# Patient Record
Sex: FEMALE | Race: WHITE | NOT HISPANIC OR LATINO | Employment: STUDENT | URBAN - METROPOLITAN AREA
[De-identification: names, ages, dates, MRNs, and addresses within clinical notes are randomized per-mention and may not be internally consistent; named-entity substitution may affect disease eponyms.]

---
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
## 2019-03-18 LAB
INFLUENZA A ANTIGEN, POC: NEGATIVE
INFLUENZA B ANTIGEN, POC: NEGATIVE
RAPID GROUP A STREP (OHS): NEGATIVE

## 2021-12-06 ENCOUNTER — HISTORICAL (OUTPATIENT)
Dept: ADMINISTRATIVE | Facility: HOSPITAL | Age: 21
End: 2021-12-06

## 2022-02-24 ENCOUNTER — HISTORICAL (OUTPATIENT)
Dept: SURGERY | Facility: HOSPITAL | Age: 22
End: 2022-02-24

## 2022-04-07 ENCOUNTER — HISTORICAL (OUTPATIENT)
Dept: ADMINISTRATIVE | Facility: HOSPITAL | Age: 22
End: 2022-04-07
Payer: COMMERCIAL

## 2022-04-23 VITALS
SYSTOLIC BLOOD PRESSURE: 136 MMHG | DIASTOLIC BLOOD PRESSURE: 93 MMHG | BODY MASS INDEX: 25.7 KG/M2 | WEIGHT: 145.06 LBS | HEIGHT: 63 IN | OXYGEN SATURATION: 98 %

## 2022-05-02 ENCOUNTER — OFFICE VISIT (OUTPATIENT)
Dept: ORTHOPEDICS | Facility: CLINIC | Age: 22
End: 2022-05-02
Payer: COMMERCIAL

## 2022-05-02 VITALS
HEIGHT: 62 IN | DIASTOLIC BLOOD PRESSURE: 87 MMHG | HEART RATE: 76 BPM | WEIGHT: 145 LBS | BODY MASS INDEX: 26.68 KG/M2 | SYSTOLIC BLOOD PRESSURE: 124 MMHG

## 2022-05-02 DIAGNOSIS — Z98.890 S/P ARTHROSCOPY OF KNEE: Primary | ICD-10-CM

## 2022-05-02 PROCEDURE — 3008F PR BODY MASS INDEX (BMI) DOCUMENTED: ICD-10-PCS | Mod: CPTII,,, | Performed by: ORTHOPAEDIC SURGERY

## 2022-05-02 PROCEDURE — 3008F BODY MASS INDEX DOCD: CPT | Mod: CPTII,,, | Performed by: ORTHOPAEDIC SURGERY

## 2022-05-02 PROCEDURE — 3079F DIAST BP 80-89 MM HG: CPT | Mod: CPTII,,, | Performed by: ORTHOPAEDIC SURGERY

## 2022-05-02 PROCEDURE — 3079F PR MOST RECENT DIASTOLIC BLOOD PRESSURE 80-89 MM HG: ICD-10-PCS | Mod: CPTII,,, | Performed by: ORTHOPAEDIC SURGERY

## 2022-05-02 PROCEDURE — 99024 PR POST-OP FOLLOW-UP VISIT: ICD-10-PCS | Mod: ,,, | Performed by: ORTHOPAEDIC SURGERY

## 2022-05-02 PROCEDURE — 3074F PR MOST RECENT SYSTOLIC BLOOD PRESSURE < 130 MM HG: ICD-10-PCS | Mod: CPTII,,, | Performed by: ORTHOPAEDIC SURGERY

## 2022-05-02 PROCEDURE — 1160F PR REVIEW ALL MEDS BY PRESCRIBER/CLIN PHARMACIST DOCUMENTED: ICD-10-PCS | Mod: CPTII,,, | Performed by: ORTHOPAEDIC SURGERY

## 2022-05-02 PROCEDURE — 1160F RVW MEDS BY RX/DR IN RCRD: CPT | Mod: CPTII,,, | Performed by: ORTHOPAEDIC SURGERY

## 2022-05-02 PROCEDURE — 1159F MED LIST DOCD IN RCRD: CPT | Mod: CPTII,,, | Performed by: ORTHOPAEDIC SURGERY

## 2022-05-02 PROCEDURE — 1159F PR MEDICATION LIST DOCUMENTED IN MEDICAL RECORD: ICD-10-PCS | Mod: CPTII,,, | Performed by: ORTHOPAEDIC SURGERY

## 2022-05-02 PROCEDURE — 99024 POSTOP FOLLOW-UP VISIT: CPT | Mod: ,,, | Performed by: ORTHOPAEDIC SURGERY

## 2022-05-02 PROCEDURE — 3074F SYST BP LT 130 MM HG: CPT | Mod: CPTII,,, | Performed by: ORTHOPAEDIC SURGERY

## 2022-05-02 NOTE — HISTORICAL [facility] CERNER
This is a historical note converted from Lucian. Formatting and pictures may have been removed.  Please reference Lucian for original formatting and attached multimedia. Chief Complaint  New patient here with left knee increasing pain over the last week. Amb with brace. Urgent care- meds given. No specefic injury. Did have trouble in High school. Xrays today. Cheer  @ [facility]. Unable to obtain BP.  History of Present Illness  She is a pleasant 22yo who presents with an exacerbation of knee pain over the past week. She originally injured her knee in high school but never had formal imaging. Her knee has bothered her intermittently since then. She denies recent reinjury. 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 When she got up from that spot she felt knee pain. Pain worsened throughout the week and shes had difficulty ambulating. She does report some swelling in her knee. She was seen at an Urgent care where they prescribed oral Prednisone and Norco. Pain is worse with ambulating. It is ok with bending but hurts with extension. She has been wearing a knee brace without much relief.  Review of Systems  Comprehensive review of system was performed with no exceptions other than noted in the history of present illness  Physical Exam  Vitals & Measurements  HT:?161.00?cm? WT:?65.800?kg? BMI:?25.38? LMP:?11/25/2021 00:00 CST?  Gen: WN, WD, NAD  Card/Res: NL breathing, +distal pulses  Abdomen: ND  Standing exam  stance: normal alignment, no significant leg-length discrepancy  gait:?limp  ?   Knee examination  - General comments: unremarkable appearance  ?   - Tenderness: lateral, suprapatellar  ?   Knee???????????????RIGHT??????????LEFT  Skin:??????????????? Intact ??????????Intact  ROM:??????????????? 0-130??????????0-130  Effusion:??????????? Neg???????????? +  MJL TTP:?????????? Neg?????????? ?? Neg  LJL TTP:????????????Neg?????????????  +  Ang:???????? ?Neg???????????? Neg  Pat crep:??????????? Neg???????????? Neg  Patella TTPs:????? Neg?????????????Neg  Patella grind:????? Neg??????????? ?Neg  Lachman:???????????Neg???????????? Neg  Pivot shift: ?????? ??Neg??????????? Neg  Valgus stress:???? ?Neg??????? ???Neg  Varus stress:?????? Neg????????????+  Posterior drawer: Neg????????? ??Neg  ?   N-V????????????????intact????????????? intact  Hip:?????????????????nml?????????????? nml  ?   Lower extremity edema:Negative??  Assessment/Plan  1.?Internal derangement of knee joint?M23.90  ?Xrays today show normal bony alignment. Concern for meniscus or ligament injury. We will order an MRI and see her back after for results.  Orders:  ketorolac, 10 mg = 1 tab(s), Oral, TID, X 5 day(s), # 15 tab(s), 0 Refill(s), Pharmacy: [id number], 161, cm, Height/Length Dosing, 12/06/21 16:10:00 CST, 65.8, kg, Weight Dosing, 12/06/21 16:10:00 CST  MRI Ext Lower Joint Left W/O Contrast, Routine, 12/06/21 16:16:00 CST, Other (please specify), knee, None, Stretcher, Patient Has IV?, Rad Type, Order for future visit, Left knee pain, Schedule this test, [facility], 12/06/21 16:16:00 CST  Referrals  Clinic Follow up, *Est. 12/13/21 3:00:00 CST, Order for future visit, Internal derangement of knee joint, [facility]   Problem List/Past Medical History  Ongoing  Deviated nasal septum  Exercise tolerance  Nasal fracture  Sinusitis  Historical  Able to lie down  Procedure/Surgical History  95333 - BILAT RESECT INFERIOR TURBINATE (Bilateral) (03/07/2017)  68251 - REP NASAL SEPTUM / REPL W/ GRFT (None) (03/07/2017)  31309 - BILAT SURG FESS TOTAL ETHMOID (Bilateral) (03/07/2017)  59980 - BILAT SURG FESS MAXIL W/ TIS REM MAXIL SINUS (Bilateral) (03/07/2017)  16737 - SURG FESS W/ FRONTAL SINUS EXPLORATION (Bilateral) (03/07/2017)  99885 - BILAT SURG FESS W/ SPHEN W/ TIS REM SPHEN SINUS (Bilateral) (03/07/2017)  Excision of Left Ethmoid Sinus, Percutaneous  Endoscopic Approach (03/07/2017)  Excision of Left Frontal Sinus, Percutaneous Endoscopic Approach (03/07/2017)  Excision of Left Maxillary Sinus, Percutaneous Endoscopic Approach (03/07/2017)  Excision of Left Sphenoid Sinus, Percutaneous Endoscopic Approach (03/07/2017)  Excision of Nasal Septum, Open Approach (03/07/2017)  Excision of Nasal Turbinate, Percutaneous Endoscopic Approach (03/07/2017)  Excision of Right Ethmoid Sinus, Percutaneous Endoscopic Approach (03/07/2017)  Excision of Right Frontal Sinus, Percutaneous Endoscopic Approach (03/07/2017)  Excision of Right Maxillary Sinus, Percutaneous Endoscopic Approach (03/07/2017)  Excision of Right Sphenoid Sinus, Percutaneous Endoscopic Approach (03/07/2017)  Nasal/sinus endoscopy, surgical with frontal sinus exploration, with or without removal of tissue from frontal sinus (03/07/2017)  Nasal/sinus endoscopy, surgical, with maxillary antrostomy; with removal of tissue from maxillary sinus (03/07/2017)  Nasal/sinus endoscopy, surgical, with sphenoidotomy; with removal of tissue from the sphenoid sinus (03/07/2017)  Nasal/sinus endoscopy, surgical; with ethmoidectomy, total (anterior and posterior) (03/07/2017)  Septoplasty or submucous resection, with or without cartilage scoring, contouring or replacement with graft (03/07/2017)  Submucous resection inferior turbinate, partial or complete, any method (03/07/2017)  adenoidectomy  PE tubes   Medications  acetaminophen-hydrocodone 325 mg-5 mg oral tablet, 1 tab(s), Oral, q6hr  prednisONE 10 mg oral tablet, 30 mg= 3 tab(s), Oral, Daily,? ?Not taking  Toradol 10 mg oral tablet, 10 mg= 1 tab(s), Oral, TID  Allergies  No Known Allergies  Social History  Abuse/Neglect  No, No, Yes, 12/06/2021  Alcohol  Current, Beer, 1-2 times per week, 07/16/2021  Employment/School  Student, 03/03/2017  Substance Use  Never, 07/16/2021  Tobacco  Never (less than 100 in lifetime), N/A, 12/06/2021  Family History  Family history is  negative  Health Maintenance  Health Maintenance  ???Pending?(in the next year)  ??? ??Due?  ??? ? ? ?ADL Screening due??12/06/21??and every 1??year(s)  ??? ? ? ?Depression Screening due??12/06/21??Unknown Frequency  ??? ? ? ?Tetanus Vaccine due??12/06/21??and every 10??year(s)  ??? ??Due In Future?  ??? ? ? ?Obesity Screening not due until??01/01/22??and every 1??year(s)  ??? ? ? ?Alcohol Misuse Screening not due until??01/02/22??and every 1??year(s)  ??? ? ? ?Blood Pressure Screening not due until??07/16/22??and every 1??year(s)  ???Satisfied?(in the past 1 year)  ??? ??Satisfied?  ??? ? ? ?Alcohol Misuse Screening on??12/06/21.??Satisfied by Anna Diggs L. L.  ??? ? ? ?Blood Pressure Screening on??07/16/21.??Satisfied by Venancio Beltran  ??? ? ? ?Body Mass Index Check on??12/06/21.??Satisfied by Anna Diggs L. L.  ??? ? ? ?Cervical Cancer Screening on??03/15/21.??Satisfied by Savanah Varela  ??? ? ? ?Influenza Vaccine on??12/06/21.??Satisfied by Anna Diggs L. L.  ??? ? ? ?Obesity Screening on??12/06/21.??Satisfied by Anna Diggs L. L.  ?

## 2022-05-02 NOTE — PROGRESS NOTES
Chief Complaint:   Chief Complaint   Patient presents with    Left Knee - Follow-up    Follow-up     6wk F/U Left knee arthroscopic plica excision with a lateral patellar friction syndrome release sx 2/24/22 GL 5/25/22       History of present illness:  02/24/2022:  Left knee arthroscopic plica excision with a lateral release    She returns today.  She has been back in school without difficulty.  She occasionally has a pop in the knee but not much in the way of pain.  So far she is happy with the results.  She has finished up her physical therapy    Musculoskeletal:   Her incisions are well healed.  Her range of motion is full.  Distally she is neurovascular intact       Assessment: S/P arthroscopy of knee        Plan:  Doing well status post above.  Continue home exercise program.  I will see her back if she has any issues

## 2022-05-14 NOTE — OP NOTE
Patient:   Lindsey Gallegos            MRN: [id number]            FIN: [id number]               Age:   22 years     Sex:  Female     :  2000   Associated Diagnoses:   None   Author:   Nicholas Fraire Jr, MD      SURGEON: Nicholas Fraire MD     ASSISTANT: Bita Dubois NP. Mrs. Dubois was essential in manipulating the leg while I performed the arthroscopy, retraction, and closure.    PREOPERATIVE DIAGNOSIS:   Left knee plica syndrome    POSTOPERATIVE DIAGNOSIS:   Left knee plica syndrome, lateral patellar friction syndrome    PROCEDURE PERFORMED:  1.  Left knee arthroscopic plica excision with a lateral release    ESTIMATED BLOOD LOSS: 10cc.    COMPLICATIONS: None.    TOURNIQUET TIME: About 20 minutes.    ANTIBIOTICS: Ancef     INDICATIONS FOR PROCEDURE: Lindsey is a 22y.o. female who has had ongoing left knee pain. The patient has had to limit activity due to intermittent pain & occasional mechanical symptoms. An MRI was performed that showed lateral plica that I felt would be amenable to arthroscopic debridement. The patient decided to opt for surgery after failing conservative management.    OPERATIVE REPORT: Lindsey was initially seen in the preoperative holding area where history and physical were reviewed without change. The operative leg was marked, consents were reviewed, and any questions were answered for the patient and family. The patient was then taken back to the operating room, placed supine on the operating table with all bony prominences padded. Then a nonsterile tourniquet was placed around the upper thigh. The patient was induced under general anesthesia. The operative lower extremity was prepped and draped in standard sterile fashion. A timeout led by the surgeon was performed, and preoperative antibiotics were given. The limp was exsanguinated with gravity. The tourniquet was raised to 100 mmHg over the systolic blood pressure.    The knee was then flexed and the inferolateral portal  was created with an #11 blade scalpel.    The camera was introduced, using the blunt trocar, into the suprapatellar pouch. The undersurface of the patella was found to have no chondral wear and the trochlear groove was found to have no chondral wear . The camera was then taken down into the lateral gutter, where no loose bodies and a plica were found. The camera was then brought into the medial gutter, where no loose bodies and no plica were seen. The camera was then brought into the medial compartment.  I used the shaver in order to debride the lateral plica and then also removed a portion of the lateral capsule in order to create more space laterally.    An inferomedial portal was then localized with a spinal needle, and created using an #11 blade. The medial meniscus was probed and found to have a no tears.  The medial femoral condyle was found to have no wear. The medial tibial plateau demonstrated no wear.    The camera was then turned to the notch, where the ACL was found to be intact.    Then the leg was brought into figure-of-four position. The camera was brought into the lateral compartment. The lateral meniscus was probed and found to have no tears.  The lateral femoral condyle was found to have no chondral wear. The lateral tibial plateau had no chondral wear.    The knee was once more examined for loose bodies, of which none were found. The knee was then evacuated of all fluids. The portals were closed with 3-0 monocyrl interrupted sutures and steristrips. 10mL of 0.25% Bupivicaine were infiltrated around each portal. A sterile dressing was placed, and the tourniquet was deflated after about 20 minutes. The patient was awakened from anesthesia and taken to the postoperative care unit in stable condition.

## 2022-09-15 ENCOUNTER — HISTORICAL (OUTPATIENT)
Dept: ADMINISTRATIVE | Facility: HOSPITAL | Age: 22
End: 2022-09-15
Payer: COMMERCIAL

## 2023-02-15 ENCOUNTER — HOSPITAL ENCOUNTER (OUTPATIENT)
Dept: RADIOLOGY | Facility: CLINIC | Age: 23
Discharge: HOME | End: 2023-02-15
Attending: ORTHOPAEDIC SURGERY
Payer: COMMERCIAL

## 2023-02-15 ENCOUNTER — OFFICE VISIT (OUTPATIENT)
Dept: ORTHOPEDICS | Facility: CLINIC | Age: 23
End: 2023-02-15
Payer: COMMERCIAL

## 2023-02-15 VITALS
HEIGHT: 62 IN | BODY MASS INDEX: 28.52 KG/M2 | HEART RATE: 76 BPM | DIASTOLIC BLOOD PRESSURE: 87 MMHG | WEIGHT: 155 LBS | SYSTOLIC BLOOD PRESSURE: 124 MMHG

## 2023-02-15 DIAGNOSIS — M25.512 ACUTE PAIN OF LEFT SHOULDER: ICD-10-CM

## 2023-02-15 DIAGNOSIS — M54.12 CERVICAL RADICULOPATHY: Primary | ICD-10-CM

## 2023-02-15 PROCEDURE — 3079F PR MOST RECENT DIASTOLIC BLOOD PRESSURE 80-89 MM HG: ICD-10-PCS | Mod: CPTII,,, | Performed by: ORTHOPAEDIC SURGERY

## 2023-02-15 PROCEDURE — 3079F DIAST BP 80-89 MM HG: CPT | Mod: CPTII,,, | Performed by: ORTHOPAEDIC SURGERY

## 2023-02-15 PROCEDURE — 73030 XR SHOULDER COMPLETE 2 OR MORE VIEWS LEFT: ICD-10-PCS | Mod: LT,,, | Performed by: ORTHOPAEDIC SURGERY

## 2023-02-15 PROCEDURE — 3074F PR MOST RECENT SYSTOLIC BLOOD PRESSURE < 130 MM HG: ICD-10-PCS | Mod: CPTII,,, | Performed by: ORTHOPAEDIC SURGERY

## 2023-02-15 PROCEDURE — 72050 XR CERVICAL SPINE COMPLETE 5 VIEW: ICD-10-PCS | Mod: ,,, | Performed by: ORTHOPAEDIC SURGERY

## 2023-02-15 PROCEDURE — 1159F MED LIST DOCD IN RCRD: CPT | Mod: CPTII,,, | Performed by: ORTHOPAEDIC SURGERY

## 2023-02-15 PROCEDURE — 3008F BODY MASS INDEX DOCD: CPT | Mod: CPTII,,, | Performed by: ORTHOPAEDIC SURGERY

## 2023-02-15 PROCEDURE — 3008F PR BODY MASS INDEX (BMI) DOCUMENTED: ICD-10-PCS | Mod: CPTII,,, | Performed by: ORTHOPAEDIC SURGERY

## 2023-02-15 PROCEDURE — 1159F PR MEDICATION LIST DOCUMENTED IN MEDICAL RECORD: ICD-10-PCS | Mod: CPTII,,, | Performed by: ORTHOPAEDIC SURGERY

## 2023-02-15 PROCEDURE — 3074F SYST BP LT 130 MM HG: CPT | Mod: CPTII,,, | Performed by: ORTHOPAEDIC SURGERY

## 2023-02-15 PROCEDURE — 73030 X-RAY EXAM OF SHOULDER: CPT | Mod: LT,,, | Performed by: ORTHOPAEDIC SURGERY

## 2023-02-15 PROCEDURE — 99213 PR OFFICE/OUTPT VISIT, EST, LEVL III, 20-29 MIN: ICD-10-PCS | Mod: ,,, | Performed by: ORTHOPAEDIC SURGERY

## 2023-02-15 PROCEDURE — 99213 OFFICE O/P EST LOW 20 MIN: CPT | Mod: ,,, | Performed by: ORTHOPAEDIC SURGERY

## 2023-02-15 PROCEDURE — 72050 X-RAY EXAM NECK SPINE 4/5VWS: CPT | Mod: ,,, | Performed by: ORTHOPAEDIC SURGERY

## 2023-02-15 RX ORDER — METHYLPREDNISOLONE 4 MG/1
TABLET ORAL
Qty: 21 EACH | Refills: 0 | Status: SHIPPED | OUTPATIENT
Start: 2023-02-15 | End: 2023-03-08

## 2023-02-15 NOTE — PROGRESS NOTES
Chief Complaint:   Chief Complaint   Patient presents with    Left Shoulder - Numbness, Pain    Shoulder Pain     left shoulder pain and numbness after reaching for something in classroom today, does not remember doing anything abnormal to make it hurt       Consulting Physician: No ref. provider found    History of present illness:    she is a pleasant 23 y.o. year old female who was reaching with the right arm and her left arm became numb.  She had numbness from basically the mid arm down.  She is noticed no weakness.  It has been persistently numb for today.  She denies any neck pain or shoulder pain.  She does not have pain at all.      History reviewed. No pertinent past medical history.    Past Surgical History:   Procedure Laterality Date    KNEE SURGERY      NOSE SURGERY         No current outpatient medications on file.     No current facility-administered medications for this visit.       Review of patient's allergies indicates:  No Known Allergies    Family History   Problem Relation Age of Onset    No Known Problems Mother     Hypertension Father     Cancer Paternal Grandmother        Social History     Socioeconomic History    Marital status: Single   Tobacco Use    Smoking status: Never    Smokeless tobacco: Never   Substance and Sexual Activity    Alcohol use: Yes    Drug use: Never    Sexual activity: Yes       Review of Systems:    Constitution:   Denies chills, fever, and sweats.  HENT:   Denies headaches or blurry vision.  Cardiovascular:  Denies chest pain or irregular heart beat.  Respiratory:   Denies cough or shortness of breath.  Gastrointestinal:  Denies abdominal pain, nausea, or vomiting.  Musculoskeletal:   Denies muscle cramps.  Neurological:   Denies dizziness or focal weakness.  Psychiatric/Behavior: Normal mental status.  Hematology/Lymph:  Denies bleeding problem or easy bruising/bleeding.  Skin:    Denies rash or suspicious lesions.    Examination:    Vital Signs:    Vitals:     "02/15/23 1436 02/15/23 1437   BP: 124/87    Pulse: 76    Weight: 70.3 kg (155 lb)    Height: 5' 2" (1.575 m)    PainSc:    2       Body mass index is 28.35 kg/m².    Constitution:   Well-developed, well nourished patient in no acute distress.  Neurological:   Alert and oriented x 3 and cooperative to examination.     Psychiatric/Behavior: Normal mental status.  Respiratory:   No shortness of breath.  Eyes:    Extraoccular muscles intact  Skin:    No scars, rash or suspicious lesions.    MSK:   Shoulder Exam:                   Right        Left  Skin:                                   Normal     Normal  AC joint tenderness:           None         None  Forward Flexion:                180            180  Abduction:                          180           180  External Rotation:               80              80  Internal Rotation:                80             80  Supraspinatus stress test: Neg           Neg  Hawkin's Impingement:     Neg           Neg  Neer Impingement:            Neg           Neg  Apprehension:                   Neg           Neg  Morovis's:                           Neg           Neg  Speed's test:                     Neg            Neg  Strength:  External Rotation:           5/5                5/5  Lift Off/belly press:          5/5                5/5    N-V status:                   Intact             Intact    C-spine: Normal ROM, NT      Imaging: X-rays ordered and images interpreted today personally by me of four views of the shoulder and five views of the cervical spine showed normal bony alignment.         Assessment: Cervical radiculopathy  -     X-Ray Cervical Spine Complete 5 view; Future; Expected date: 02/15/2023  -     X-Ray Shoulder 2 or More Views Left; Future; Expected date: 02/15/2023    Other orders  -     methylPREDNISolone (MEDROL DOSEPACK) 4 mg tablet; use as directed  Dispense: 21 each; Refill: 0        Plan:  We will start a Medrol Dosepak.  If her pain persists will consider " EMG or MRI.

## 2023-12-18 ENCOUNTER — OFFICE VISIT (OUTPATIENT)
Dept: URGENT CARE | Facility: CLINIC | Age: 23
End: 2023-12-18
Payer: COMMERCIAL

## 2023-12-18 VITALS
HEART RATE: 85 BPM | RESPIRATION RATE: 16 BRPM | TEMPERATURE: 98 F | DIASTOLIC BLOOD PRESSURE: 85 MMHG | HEIGHT: 62 IN | SYSTOLIC BLOOD PRESSURE: 124 MMHG | BODY MASS INDEX: 28.71 KG/M2 | OXYGEN SATURATION: 98 % | WEIGHT: 156 LBS

## 2023-12-18 DIAGNOSIS — B00.9 HERPES SIMPLEX: Primary | ICD-10-CM

## 2023-12-18 DIAGNOSIS — J06.9 ACUTE URI: ICD-10-CM

## 2023-12-18 PROCEDURE — 99203 PR OFFICE/OUTPT VISIT, NEW, LEVL III, 30-44 MIN: ICD-10-PCS | Mod: 25,,, | Performed by: PHYSICIAN ASSISTANT

## 2023-12-18 PROCEDURE — 96372 THER/PROPH/DIAG INJ SC/IM: CPT | Mod: ,,, | Performed by: PHYSICIAN ASSISTANT

## 2023-12-18 PROCEDURE — 96372 PR INJECTION,THERAP/PROPH/DIAG2ST, IM OR SUBCUT: ICD-10-PCS | Mod: ,,, | Performed by: PHYSICIAN ASSISTANT

## 2023-12-18 PROCEDURE — 99203 OFFICE O/P NEW LOW 30 MIN: CPT | Mod: 25,,, | Performed by: PHYSICIAN ASSISTANT

## 2023-12-18 RX ORDER — VALACYCLOVIR HYDROCHLORIDE 1 G/1
2000 TABLET, FILM COATED ORAL EVERY 12 HOURS
Qty: 4 TABLET | Refills: 0 | Status: SHIPPED | OUTPATIENT
Start: 2023-12-18 | End: 2023-12-19

## 2023-12-18 RX ORDER — DEXAMETHASONE SODIUM PHOSPHATE 100 MG/10ML
10 INJECTION INTRAMUSCULAR; INTRAVENOUS
Status: COMPLETED | OUTPATIENT
Start: 2023-12-18 | End: 2023-12-18

## 2023-12-18 RX ADMIN — DEXAMETHASONE SODIUM PHOSPHATE 10 MG: 100 INJECTION INTRAMUSCULAR; INTRAVENOUS at 03:12

## 2023-12-18 NOTE — PROGRESS NOTES
"Subjective:      Patient ID: Lindsey Gallegos is a 23 y.o. female.    Vitals:  height is 5' 2" (1.575 m) and weight is 70.8 kg (156 lb). Her temperature is 98.3 °F (36.8 °C). Her blood pressure is 124/85 and her pulse is 85. Her respiration is 16 and oxygen saturation is 98%.     Chief Complaint: Sinus Problem    Patient presents to the clinic today with complaints of cough, nasal congestion X 1 week. Dayquil and Claritin taken with moderate relief.  Denies any fever or shortness of breath.  She reports a 2 day history of fever blisters present on both the upper and lower lip.      ROS   Objective:     Physical Exam   Constitutional: She is oriented to person, place, and time. She appears well-developed. She is cooperative.  Non-toxic appearance. She does not appear ill. No distress.   HENT:   Head: Normocephalic and atraumatic.   Ears:   Right Ear: Hearing, tympanic membrane, external ear and ear canal normal.   Left Ear: Hearing, tympanic membrane, external ear and ear canal normal.   Nose: Nose normal. No nasal deformity. No epistaxis.   Mouth/Throat: Uvula is midline, oropharynx is clear and moist and mucous membranes are normal. No trismus in the jaw. Normal dentition. No uvula swelling. No oropharyngeal exudate, posterior oropharyngeal edema or posterior oropharyngeal erythema.   Eyes: Conjunctivae and lids are normal. No scleral icterus.   Neck: Trachea normal and phonation normal. Neck supple. No edema present. No erythema present. No neck rigidity present.   Cardiovascular: Normal rate, regular rhythm, normal heart sounds and normal pulses.   Pulmonary/Chest: Effort normal and breath sounds normal. No respiratory distress. She has no decreased breath sounds. She has no rhonchi.   Abdominal: Normal appearance.   Musculoskeletal: Normal range of motion.         General: No deformity. Normal range of motion.   Neurological: She is alert and oriented to person, place, and time. She exhibits normal muscle " "tone. Coordination normal.   Skin: Skin is warm, dry, intact, not diaphoretic and not pale.   Psychiatric: Her speech is normal and behavior is normal. Judgment and thought content normal.   Nursing note and vitals reviewed.         Previous History      Review of patient's allergies indicates:  No Known Allergies    Past Medical History:   Diagnosis Date    Known health problems: none      Current Outpatient Medications   Medication Instructions    valACYclovir (VALTREX) 2,000 mg, Oral, Every 12 hours     Past Surgical History:   Procedure Laterality Date    KNEE SURGERY      NOSE SURGERY       Family History   Problem Relation Age of Onset    No Known Problems Mother     Hypertension Father     Cancer Paternal Grandmother        Social History     Tobacco Use    Smoking status: Never     Passive exposure: Never    Smokeless tobacco: Never   Substance Use Topics    Alcohol use: Yes    Drug use: Never        Physical Exam      Vital Signs Reviewed   /85   Pulse 85   Temp 98.3 °F (36.8 °C)   Resp 16   Ht 5' 2" (1.575 m)   Wt 70.8 kg (156 lb)   LMP 12/01/2023   SpO2 98%   BMI 28.53 kg/m²        Procedures    Procedures     Labs     Results for orders placed or performed in visit on 09/15/22   POCT rapid strep A   Result Value Ref Range    Rapid Group A Strep Negative    POCT Influenza A/B Molecular   Result Value Ref Range    Inflenza A Ag Negative     Influenza B Ag Negative      Assessment:     1. Herpes simplex    2. Acute URI        Plan:       Herpes simplex    Acute URI    Other orders  -     dexAMETHasone injection 10 mg  -     valACYclovir (VALTREX) 1000 MG tablet; Take 2 tablets (2,000 mg total) by mouth every 12 (twelve) hours. for 1 day  Dispense: 4 tablet; Refill: 0    Drink plenty of fluids.     Get plenty of rest.     Tylenol or Motrin as needed.     Go to the ER with any significant change or worsening of symptoms.     Follow up with your primary care doctor.                   "

## 2024-01-13 ENCOUNTER — OFFICE VISIT (OUTPATIENT)
Dept: URGENT CARE | Facility: CLINIC | Age: 24
End: 2024-01-13
Payer: COMMERCIAL

## 2024-01-13 VITALS
BODY MASS INDEX: 28.71 KG/M2 | DIASTOLIC BLOOD PRESSURE: 72 MMHG | TEMPERATURE: 101 F | RESPIRATION RATE: 18 BRPM | HEIGHT: 62 IN | WEIGHT: 156 LBS | OXYGEN SATURATION: 100 % | HEART RATE: 119 BPM | SYSTOLIC BLOOD PRESSURE: 112 MMHG

## 2024-01-13 DIAGNOSIS — R09.81 NASAL CONGESTION: ICD-10-CM

## 2024-01-13 DIAGNOSIS — J11.1 INFLUENZA: Primary | ICD-10-CM

## 2024-01-13 LAB
CTP QC/QA: YES
CTP QC/QA: YES
POC MOLECULAR INFLUENZA A AGN: POSITIVE
POC MOLECULAR INFLUENZA B AGN: NEGATIVE
SARS-COV-2 RDRP RESP QL NAA+PROBE: NEGATIVE

## 2024-01-13 PROCEDURE — 87502 INFLUENZA DNA AMP PROBE: CPT | Mod: QW,,, | Performed by: FAMILY MEDICINE

## 2024-01-13 PROCEDURE — 87635 SARS-COV-2 COVID-19 AMP PRB: CPT | Mod: QW,,, | Performed by: FAMILY MEDICINE

## 2024-01-13 PROCEDURE — 99213 OFFICE O/P EST LOW 20 MIN: CPT | Mod: ,,, | Performed by: FAMILY MEDICINE

## 2024-01-13 RX ORDER — OSELTAMIVIR PHOSPHATE 75 MG/1
75 CAPSULE ORAL 2 TIMES DAILY
Qty: 10 CAPSULE | Refills: 0 | Status: SHIPPED | OUTPATIENT
Start: 2024-01-13 | End: 2024-01-18

## 2024-01-13 NOTE — LETTER
January 13, 2024      [facility]  [address] [address]  Phone: [phone]       Patient: Lindsey Gallegos   YOB: 2000  Date of Visit: 01/13/2024    To Whom It May Concern:    Scott Gallegos  was at [facility] on 01/13/2024. The patient may return to work/school on 01/16/2024 with no restrictions. If you have any questions or concerns, or if I can be of further assistance, please do not hesitate to contact me.    Sincerely,    Heather Otero MA

## 2024-01-13 NOTE — PROGRESS NOTES
"Subjective:      Patient ID: Lindsey Gallegos is a 23 y.o. female.    Vitals:  height is 5' 2" (1.575 m) and weight is 70.8 kg (156 lb). Her temperature is 100.8 °F (38.2 °C) (abnormal). Her blood pressure is 112/72 and her pulse is 119 (abnormal). Her respiration is 18 and oxygen saturation is 100%.     Chief Complaint: Fever (Fever(103* x today), NC, slight cough, HA, nausea, vomiting( 1 episode) x yesterday  Dayquil, Advil mild/Denies BA, chest pain, SOB, wheezing, ST)    [age] yo Female presents to clinic complaining of a fever congestion mild cough body aches nausea started with a headache yesterday.  Fever today of 103.  Denies any shortness a breath or diarrhea         Constitution: Positive for fever.   HENT: Negative.     Cardiovascular: Negative.    Eyes: Negative.    Respiratory: Negative.     Gastrointestinal: Negative.    Genitourinary: Negative.    Musculoskeletal: Negative.    Skin: Negative.    Allergic/Immunologic: Negative.    Neurological: Negative.    Hematologic/Lymphatic: Negative.       Objective:     Physical Exam   Constitutional: She is oriented to person, place, and time. She appears well-developed. She is cooperative.  Non-toxic appearance. She does not appear ill. No distress.   HENT:   Head: Normocephalic and atraumatic.   Ears:   Right Ear: Hearing and external ear normal.   Left Ear: Hearing and external ear normal.   Mouth/Throat: Oropharynx is clear and moist and mucous membranes are normal.   Eyes: Conjunctivae and lids are normal.   Neck: Trachea normal and phonation normal. Neck supple. No edema present. No erythema present. No neck rigidity present.   Cardiovascular: Normal rate.   Pulmonary/Chest: Effort normal and breath sounds normal. No stridor. No respiratory distress. She has no decreased breath sounds. She has no wheezes. She has no rhonchi. She has no rales.   Abdominal: Normal appearance.   Neurological: She is alert and oriented to person, place, and time. " "She exhibits normal muscle tone. Coordination normal.   Skin: Skin is warm, dry, intact, not diaphoretic and no rash.   Psychiatric: Her speech is normal and behavior is normal. Mood, judgment and thought content normal.   Nursing note and vitals reviewed.       Previous History      Review of patient's allergies indicates:  No Known Allergies    Past Medical History:   Diagnosis Date    Known health problems: none      Current Outpatient Medications   Medication Instructions    oseltamivir (TAMIFLU) 75 mg, Oral, 2 times daily    valACYclovir (VALTREX) 2,000 mg, Oral, Every 12 hours     Past Surgical History:   Procedure Laterality Date    KNEE SURGERY      NOSE SURGERY       Family History   Problem Relation Age of Onset    No Known Problems Mother     Hypertension Father     Cancer Paternal Grandmother        Social History     Tobacco Use    Smoking status: Never     Passive exposure: Never    Smokeless tobacco: Never   Substance Use Topics    Alcohol use: Yes    Drug use: Never        Physical Exam      Vital Signs Reviewed   /72   Pulse (!) 119   Temp (!) 100.8 °F (38.2 °C)   Resp 18   Ht 5' 2" (1.575 m)   Wt 70.8 kg (156 lb)   LMP 12/01/2023   SpO2 100%   BMI 28.53 kg/m²        Procedures    Procedures     Labs     Results for orders placed or performed in visit on 01/13/24   POCT COVID-19 Rapid Screening   Result Value Ref Range    POC Rapid COVID Negative Negative     Acceptable Yes    POCT Influenza A/B Molecular   Result Value Ref Range    POC Molecular Influenza A Ag Positive (A) Negative, Not Reported    POC Molecular Influenza B Ag Negative Negative, Not Reported     Acceptable Yes          Assessment:     1. Influenza    2. Nasal congestion        Plan:     Flu A positive  Medications sent to pharmacy  Increase fluid intake. Monitor for fever. Take tylenol/acetaminophen or advil/ibuprofen as needed for headache, bodyaches or fever.   Treat your symptoms like " the common cold, take Delysm/dimetapp/robitussin as needed for cough, claritin, flonase, mucinex for congestion, for example.   Complications for flu include pneumonia, bronchitis, and sinusitis.   Stay home for 5 to 7 days total starting from when your symptoms began.  If your symptoms worsen, or you develop shortness of breath, worsening of cough, or fever over 103, seek medical attention immediately.     Influenza    Nasal congestion  -     POCT COVID-19 Rapid Screening  -     POCT Influenza A/B Molecular    Other orders  -     oseltamivir (TAMIFLU) 75 MG capsule; Take 1 capsule (75 mg total) by mouth 2 (two) times daily. for 5 days  Dispense: 10 capsule; Refill: 0

## 2024-01-13 NOTE — PATIENT INSTRUCTIONS
Plan:     Flu A positive  Medications sent to pharmacy  Increase fluid intake. Monitor for fever. Take tylenol/acetaminophen or advil/ibuprofen as needed for headache, bodyaches or fever.   Treat your symptoms like the common cold, take Delysm/dimetapp/robitussin as needed for cough, claritin, flonase, mucinex for congestion, for example.   Complications for flu include pneumonia, bronchitis, and sinusitis.   Stay home for 5 to 7 days total starting from when your symptoms began.  If your symptoms worsen, or you develop shortness of breath, worsening of cough, or fever over 103, seek medical attention immediately.